# Patient Record
Sex: FEMALE | Race: WHITE | NOT HISPANIC OR LATINO
[De-identification: names, ages, dates, MRNs, and addresses within clinical notes are randomized per-mention and may not be internally consistent; named-entity substitution may affect disease eponyms.]

---

## 2017-08-16 ENCOUNTER — TRANSCRIPTION ENCOUNTER (OUTPATIENT)
Age: 82
End: 2017-08-16

## 2017-09-02 ENCOUNTER — TRANSCRIPTION ENCOUNTER (OUTPATIENT)
Age: 82
End: 2017-09-02

## 2021-12-07 PROBLEM — Z00.00 ENCOUNTER FOR PREVENTIVE HEALTH EXAMINATION: Status: ACTIVE | Noted: 2021-12-07

## 2021-12-10 ENCOUNTER — APPOINTMENT (OUTPATIENT)
Dept: PAIN MANAGEMENT | Facility: CLINIC | Age: 86
End: 2021-12-10
Payer: MEDICARE

## 2021-12-10 ENCOUNTER — NON-APPOINTMENT (OUTPATIENT)
Age: 86
End: 2021-12-10

## 2021-12-10 VITALS
HEIGHT: 60 IN | HEART RATE: 78 BPM | SYSTOLIC BLOOD PRESSURE: 147 MMHG | BODY MASS INDEX: 41.23 KG/M2 | DIASTOLIC BLOOD PRESSURE: 82 MMHG | WEIGHT: 210 LBS

## 2021-12-10 PROCEDURE — 99204 OFFICE O/P NEW MOD 45 MIN: CPT

## 2021-12-10 RX ORDER — INSULIN ASPART 100 [IU]/ML
100 INJECTION, SOLUTION INTRAVENOUS; SUBCUTANEOUS
Refills: 0 | Status: ACTIVE | COMMUNITY

## 2021-12-10 NOTE — HISTORY OF PRESENT ILLNESS
[FreeTextEntry1] : HPI\par \par Ms. MEME THORNTON is a 87 year F with pmhx of cad sp stent placement on asa 81mg, DM, HTN, right TKR presents with bilateral anterior  leg pain when standing.  Pain is so bad that patient finds it difficult to perform adls and ambulate. denies any worsening numbness, weakness, bowel/bladder dysfunction\par \par \par Previous and current pain medications/doses/effects:\par \par Tramadol with mild improvement\par \par Previous Pain Treatments:\par \par PT with mild improvement\par \par Previous Pain Injections:\par \par Previous Diagnostic Studies/Images:\par \par XR LS 11/2021\par \par Advanced diffuse degenerative spondylosis, multilevel disc space narrowing, bridging syndesmophytes, absence of lordotic curvature. Spondy L4-5 [___ mths] : [unfilled] month(s) ago [10] : a maximum pain level of 10/10 [Throbbing] : throbbing [Burning] : burning [Sitting] : sitting [Standing] : standing [FreeTextEntry7] : bilateral leg pain

## 2021-12-10 NOTE — PHYSICAL EXAM
[Facet Tenderness] : facet tenderness [Walker] : ambulates with walker [] : Motor: [NL] : normal and symmetric bilaterally [de-identified] : Constitutional: Normal, well developed, no acute distress\par Eyes: Symmetric, External structures \par Oropharynx: Lips normal, symmetric, no external lesions appreciated\par Respiratory: Non-labored breathing, no audible wheezes\par Cardiac: Pulse palpated, no tachycardia\par Vascular: No cyanosis appreciated, no edema in bilateral lower extremities\par GI: Nondistended, no jaundice appreciated\par Neurovascular: CN2-12 grossly intact, Alert and oriented\par MSK: Normal muscle bulk, 5/5 Motor strength B/L in LE\par \par

## 2021-12-10 NOTE — ASSESSMENT
[FreeTextEntry1] : >> Imaging and Other Studies\par \par I personally reviewed the relevant imaging.  Discussed and explained to patient the likely source of pathology and pain.  Questions answered. XR\par \par back and leg pain likely secondary to lumbar radiculopathy and discogenic pain refractory to conservative treatments including 6 consecutive weeks of home exercises/PT, will obtain MRI LS to evaluate for pathology\par \par may consider PT vs intervention pending eval\par \par >> Therapy and Other Modalities\par \par continue PT\par \par >> Medications\par  \par Regarding opiate medication to manage pain. I had a detailed discussion with the patient regarding the risks of long-term opioid use, including the potential for medication side effects, hyperaglesia, endocrine dysfunction,  Encouraged weaning with assistance of current prescriber. - recommend discontinuation of tramadol\par \par acetaminophen 650mg q8h prn pain (caution <3g daily)\par \par >> Interventions\par \par na\par \par >> Consults\par \par >> Discussion of Risks/Benefits/Alternatives\par \par 	>Regarding any scheduled procedures:\par \par I have discussed in detail with the patient that any interventional pain procedure is associated with potential risks.  The procedure may include an injection of steroids and potentially other medications (local anesthetic and normal saline) into the epidural space or surrounding tissue of the spine.  There are significant risks of this procedure which include and are not limited to infection, bleeding, worsening pain, dural puncture leading to postdural puncture headache, nerve damage, spinal cord injury, paralysis, stroke, and death.  \par \par There is a chance that the procedure does not improve their pain.  \par \par There are risks associated with the steroid being absorbed into the body systemically.  These include dysphoria, difficulty sleeping, mood swings and personality changes.  Premenopausal women may notice an irregularity in her menstrual cycle for 2-3 months following the injection.  Steroids can specifically affect patients with hypertension, diabetes, and peptic ulcers.  The procedure may cause a temporary increase in blood pressure and blood pressure, and may adversely affect a peptic ulcer.  Other, more rare complications, include avascular necrosis of joints, glaucoma and worsening of osteoporosis. \par \par I have discussed the risks of the procedure at length with the patient, and the potential benefits of pain relief.  I have offered alternatives to the procedure.  All questions were answered.  \par \par The patient expressed understanding and wishes to proceed with the procedure.\par \par 	>Regarding COVID19 Pandemic: \par \par Any planned interventional pain procedure are scheduled because further delay may cause harm or negative outcome to patient.  The goal in performing this procedure is to avoid deterioration of function, emergency room visits (which increases exposure) and reliance on opioids.  \par \par r/b/a discussed with patient, lack of evidence to conclusively determine whether pain management procedures have any positive or negative impact on the possibility of esther the virus and/or development of any sequelae. \par \par Patient counselled regarding timing steroid based intervention 2 weeks before or after COVID-19 vaccine administration to avoid any interaction or affect on efficacy of vaccination\par \par Patient demonstrates understanding\par \par Informed patient that risks associated with the COVID-19 infection.  Informed patient steps taken to limit the risks.  We are implementing safety precautions and following protocols consistent with the CDC and state recommendations. All patients and staff will be checked for fever or signs of illness upon entry to the facility. We will limit our steroid dose to the lowest effective therapeutic dose or in some cases steroids will not be injected at all. \par \par Patient agrees to proceed\par \par >> Conclusion\par \par The above diagnosis and treatment plan is medically reasonable and necessary based on the patient encounter \par There were no barriers to communication.\par Informed patient that I would be available for any additional questions.\par Patient was instructed to call with any worsening symptoms including severe pain, new numbness/weakness, or changes in the bowel/bladder function. \par Discussed role of nsaids in pain management and all relevant risks, if patient is continuing to require after 4 weeks the patient should f/u for alternative treatment. \par Instructed patient to maintain pain diary to monitor pain level, mobility, and function.\par \par \par

## 2021-12-21 ENCOUNTER — APPOINTMENT (OUTPATIENT)
Dept: PAIN MANAGEMENT | Facility: CLINIC | Age: 86
End: 2021-12-21
Payer: MEDICARE

## 2021-12-21 PROCEDURE — 99214 OFFICE O/P EST MOD 30 MIN: CPT | Mod: 95

## 2021-12-21 NOTE — ASSESSMENT
[FreeTextEntry1] : >> Imaging and Other Studies\par \par I personally reviewed the relevant imaging.  Discussed and explained to patient the likely source of pathology and pain.  Questions answered. XR\par \par I personally reviewed the relevant imaging.  Discussed and explained to patient the likely source of pathology and pain.  Questions answered. MRI\par \par thoracic lesion found on MRI likely ddd extrusion - radiology recommended MRI w wo IVC - will obtain MRI w wo IVC\par \par creat prior to MRI to evaluate renal function\par \par >> Therapy and Other Modalities\par \par continue PT\par \par >> Medications\par  \par Regarding opiate medication to manage pain. I had a detailed discussion with the patient regarding the risks of long-term opioid use, including the potential for medication side effects, hyperaglesia, endocrine dysfunction,  Encouraged weaning with assistance of current prescriber. - recommend discontinuation of tramadol\par \par acetaminophen 650mg q8h prn pain (caution <3g daily)\par \par >> Interventions\par \par Significant component of back and leg pain likely secondary to lumbar spinal stenosis demonstrated on MRI LS.  Will schedule L4-5 interlaminar epidural steroid injection r/b/a discussed\par \par continue asa 81 for secondary prophylaxis\par \par >> Consults\par \par >> Discussion of Risks/Benefits/Alternatives\par \par 	>Regarding any scheduled procedures:\par \par I have discussed in detail with the patient that any interventional pain procedure is associated with potential risks.  The procedure may include an injection of steroids and potentially other medications (local anesthetic and normal saline) into the epidural space or surrounding tissue of the spine.  There are significant risks of this procedure which include and are not limited to infection, bleeding, worsening pain, dural puncture leading to postdural puncture headache, nerve damage, spinal cord injury, paralysis, stroke, and death.  \par \par There is a chance that the procedure does not improve their pain.  \par \par There are risks associated with the steroid being absorbed into the body systemically.  These include dysphoria, difficulty sleeping, mood swings and personality changes.  Premenopausal women may notice an irregularity in her menstrual cycle for 2-3 months following the injection.  Steroids can specifically affect patients with hypertension, diabetes, and peptic ulcers.  The procedure may cause a temporary increase in blood pressure and blood pressure, and may adversely affect a peptic ulcer.  Other, more rare complications, include avascular necrosis of joints, glaucoma and worsening of osteoporosis. \par \par I have discussed the risks of the procedure at length with the patient, and the potential benefits of pain relief.  I have offered alternatives to the procedure.  All questions were answered.  \par \par The patient expressed understanding and wishes to proceed with the procedure.\par \par 	>Regarding COVID19 Pandemic: \par \par Any planned interventional pain procedure are scheduled because further delay may cause harm or negative outcome to patient.  The goal in performing this procedure is to avoid deterioration of function, emergency room visits (which increases exposure) and reliance on opioids.  \par \par r/b/a discussed with patient, lack of evidence to conclusively determine whether pain management procedures have any positive or negative impact on the possibility of esther the virus and/or development of any sequelae. \par \par Patient counselled regarding timing steroid based intervention 2 weeks before or after COVID-19 vaccine administration to avoid any interaction or affect on efficacy of vaccination\par \par Patient demonstrates understanding\par \par Informed patient that risks associated with the COVID-19 infection.  Informed patient steps taken to limit the risks.  We are implementing safety precautions and following protocols consistent with the CDC and state recommendations. All patients and staff will be checked for fever or signs of illness upon entry to the facility. We will limit our steroid dose to the lowest effective therapeutic dose or in some cases steroids will not be injected at all. \par \par Patient agrees to proceed\par \par >> Conclusion\par \par The above diagnosis and treatment plan is medically reasonable and necessary based on the patient encounter \par There were no barriers to communication.\par Informed patient that I would be available for any additional questions.\par Patient was instructed to call with any worsening symptoms including severe pain, new numbness/weakness, or changes in the bowel/bladder function. \par Discussed role of nsaids in pain management and all relevant risks, if patient is continuing to require after 4 weeks the patient should f/u for alternative treatment. \par Instructed patient to maintain pain diary to monitor pain level, mobility, and function.\par \par I explained to patient benefits and limitation of TeleMedicine visits\par \par Patient understands that limitations include inability to perform comprehensive physical exam, which may lead to potential diagnostic inconsistencies.  \par \par Any scheduled procedures are based on history, imaging and limited physical exam performed on TeleHealth visit.  If necessary, additional focal physical exam will be performed on date of procedure\par \par Patient understands that diagnosis and treatment may be limited by these inconsistencies and patient agrees to proceed with care plan\par \par \par \par

## 2021-12-21 NOTE — HISTORY OF PRESENT ILLNESS
[Home] : at home, [unfilled] , at the time of the visit. [Medical Office: ([facility])___] : at the medical office located in  [Verbal consent obtained from patient] : the patient, [unfilled] [___ mths] : [unfilled] month(s) ago [10] : a maximum pain level of 10/10 [Throbbing] : throbbing [Burning] : burning [Sitting] : sitting [Standing] : standing [FreeTextEntry1] : Interval Note:\par \par Since last visit the pain is not improved.  Continues to have significant pain over the bilateral anterior thigh  Pain is so bad that patient finds it difficult to perform adls and ambulate. Denies any additional weakness, numbness, bowel/bladder dysfunction.  \par \par HPI\par \par Ms. MEME THORNTON is a 87 year F with pmhx of cad sp stent placement on asa 81mg, DM, HTN, right TKR presents with bilateral anterior  leg pain when standing.  Pain is so bad that patient finds it difficult to perform adls and ambulate. denies any worsening numbness, weakness, bowel/bladder dysfunction\par \par \par Previous and current pain medications/doses/effects:\par \par Tramadol with mild improvement\par \par Previous Pain Treatments:\par \par PT with mild improvement\par \par Previous Pain Injections:\par \par Previous Diagnostic Studies/Images:\par \par MRI LS 12/21\par \par Severe degenerative spondylosis is seen  Modic changes L1-2, L2-3, L3-4, L4-5\par L5-S1 mild lateral recess narrowing and mild neuroforaminal narrowing is seen due to degenerative spondylosis\par L4-5 spondylosis and disc bulging with mild central canal stenosis, marked lateral recess narrowing and marked neuroforaminal narrowings\par L2-3 and L3-4 spondylosis and disc bulging noted which resulted in marked central canal stenosis, marked lateral recess narrowing and marked neuroforaminal narrowing\par L1-2, T12-L1, T11-12 spondylosis and disc bulging noted with mild lateral recess narrowing and moderate neuroforaminal narrowing\par T10-11, spondylosis and disc bulging noted with mild lateral recess narrowing and mild neuroforaminal narrowings\par T9-10 spondylosis and disc bulging with mild central stenosis, moderate lateral recess narrowing and moderate neuroforaminal narrowing\par T8-9 spondylosis and disc bulging resulting in mild lateral recess narrowings and mild neuroforaminal narrowings\par \par Immediately posterior to T8 verteral body, there is an epidural structure seen, likely disc extrusion resulting in mild central canal stenosis.  MRI of thoracic spine w wo gadolinium recommended for additional evaluation - possible disc extrusion, but nerve sheath tumor or metastatic deposit cannot be excluded\par \par XR LS 11/2021\par \par Advanced diffuse degenerative spondylosis, multilevel disc space narrowing, bridging syndesmophytes, absence of lordotic curvature. Spondy L4-5 [Family Member] : family member [FreeTextEntry7] : bilateral leg pain

## 2021-12-21 NOTE — PHYSICAL EXAM
[de-identified] : \par Constitutional: Normal, well developed, no acute distress on audio/video examination\par Eyes: Symmetric, External structures on video examination\par ENT: Lips, mucosa and tongue normal on video examination\par Oropharynx: Lips normal, symmetric, no external lesions appreciated appreciated on video examination\par Respiratory: Non-labored breathing, no audible wheezes appreciated on audio/video examination\par Vascular: No cyanosis appreciated or edema appreciated on video examination\par GI:  no jaundice appreciated on video examination\par Neurovascular: CN grossly intact on video/audio examination, alert\par MSK: Normal muscle bulk on video examination\par

## 2021-12-27 ENCOUNTER — RESULT REVIEW (OUTPATIENT)
Age: 86
End: 2021-12-27

## 2021-12-30 ENCOUNTER — APPOINTMENT (OUTPATIENT)
Dept: PAIN MANAGEMENT | Facility: HOSPITAL | Age: 86
End: 2021-12-30

## 2022-01-05 ENCOUNTER — NON-APPOINTMENT (OUTPATIENT)
Age: 87
End: 2022-01-05

## 2022-01-05 ENCOUNTER — APPOINTMENT (OUTPATIENT)
Dept: CARDIOLOGY | Facility: CLINIC | Age: 87
End: 2022-01-05
Payer: MEDICARE

## 2022-01-05 VITALS
BODY MASS INDEX: 40.62 KG/M2 | DIASTOLIC BLOOD PRESSURE: 64 MMHG | WEIGHT: 208 LBS | OXYGEN SATURATION: 97 % | HEART RATE: 88 BPM | SYSTOLIC BLOOD PRESSURE: 164 MMHG

## 2022-01-05 DIAGNOSIS — R60.0 LOCALIZED EDEMA: ICD-10-CM

## 2022-01-05 DIAGNOSIS — Z86.39 PERSONAL HISTORY OF OTHER ENDOCRINE, NUTRITIONAL AND METABOLIC DISEASE: ICD-10-CM

## 2022-01-05 DIAGNOSIS — Z86.59 PERSONAL HISTORY OF OTHER MENTAL AND BEHAVIORAL DISORDERS: ICD-10-CM

## 2022-01-05 DIAGNOSIS — E78.5 HYPERLIPIDEMIA, UNSPECIFIED: ICD-10-CM

## 2022-01-05 DIAGNOSIS — N32.81 OVERACTIVE BLADDER: ICD-10-CM

## 2022-01-05 DIAGNOSIS — Z87.39 PERSONAL HISTORY OF OTHER DISEASES OF THE MUSCULOSKELETAL SYSTEM AND CONNECTIVE TISSUE: ICD-10-CM

## 2022-01-05 PROCEDURE — 93000 ELECTROCARDIOGRAM COMPLETE: CPT

## 2022-01-05 PROCEDURE — 99204 OFFICE O/P NEW MOD 45 MIN: CPT

## 2022-01-05 RX ORDER — CHOLECALCIFEROL (VITAMIN D3) 1250 MCG
1.25 MG CAPSULE ORAL WEEKLY
Refills: 0 | Status: ACTIVE | COMMUNITY

## 2022-01-05 RX ORDER — DESVENLAFAXINE 100 MG/1
100 TABLET, EXTENDED RELEASE ORAL DAILY
Refills: 0 | Status: ACTIVE | COMMUNITY

## 2022-01-05 RX ORDER — FUROSEMIDE 20 MG/1
20 TABLET ORAL EVERY OTHER DAY
Refills: 0 | Status: ACTIVE | COMMUNITY

## 2022-01-05 RX ORDER — AMLODIPINE BESYLATE 5 MG/1
5 TABLET ORAL DAILY
Refills: 0 | Status: DISCONTINUED | COMMUNITY
End: 2022-01-05

## 2022-01-05 RX ORDER — FUROSEMIDE 40 MG/1
40 TABLET ORAL EVERY OTHER DAY
Refills: 0 | Status: ACTIVE | COMMUNITY

## 2022-01-05 RX ORDER — INSULIN DETEMIR 100 [IU]/ML
100 INJECTION, SOLUTION SUBCUTANEOUS AT BEDTIME
Refills: 0 | Status: ACTIVE | COMMUNITY

## 2022-01-05 RX ORDER — METOPROLOL SUCCINATE 50 MG/1
50 TABLET, EXTENDED RELEASE ORAL DAILY
Refills: 0 | Status: DISCONTINUED | COMMUNITY
End: 2022-01-05

## 2022-01-05 RX ORDER — GAUZE BANDAGE 2" X 2"
BANDAGE TOPICAL
Refills: 0 | Status: ACTIVE | COMMUNITY

## 2022-01-05 RX ORDER — CARVEDILOL 6.25 MG/1
6.25 TABLET, FILM COATED ORAL
Qty: 180 | Refills: 2 | Status: ACTIVE | COMMUNITY
Start: 2022-01-05 | End: 1900-01-01

## 2022-01-05 RX ORDER — ACETAMINOPHEN 650 MG/1
650 TABLET, FILM COATED, EXTENDED RELEASE ORAL 3 TIMES DAILY
Refills: 0 | Status: ACTIVE | COMMUNITY

## 2022-01-05 RX ORDER — ASPIRIN 81 MG
81 TABLET, DELAYED RELEASE (ENTERIC COATED) ORAL DAILY
Refills: 0 | Status: ACTIVE | COMMUNITY

## 2022-01-05 RX ORDER — IBUPROFEN 200 MG
600 CAPSULE ORAL DAILY
Refills: 0 | Status: ACTIVE | COMMUNITY

## 2022-01-05 RX ORDER — OMEGA-3/DHA/EPA/FISH OIL 300-1000MG
1000 CAPSULE ORAL
Refills: 0 | Status: ACTIVE | COMMUNITY

## 2022-01-05 RX ORDER — IRBESARTAN 300 MG/1
300 TABLET ORAL DAILY
Refills: 0 | Status: ACTIVE | COMMUNITY

## 2022-01-05 RX ORDER — AMLODIPINE BESYLATE 10 MG/1
10 TABLET ORAL
Qty: 90 | Refills: 3 | Status: ACTIVE | COMMUNITY
Start: 2022-01-05 | End: 1900-01-01

## 2022-01-05 RX ORDER — ATORVASTATIN CALCIUM 80 MG/1
80 TABLET, FILM COATED ORAL AT BEDTIME
Refills: 0 | Status: ACTIVE | COMMUNITY

## 2022-01-05 RX ORDER — OXYBUTYNIN CHLORIDE 5 MG/1
5 TABLET ORAL DAILY
Refills: 0 | Status: ACTIVE | COMMUNITY

## 2022-01-05 NOTE — PHYSICAL EXAM
[Well Developed] : well developed [Well Nourished] : well nourished [No Acute Distress] : no acute distress [Normal Conjunctiva] : normal conjunctiva [Normal Venous Pressure] : normal venous pressure [No Carotid Bruit] : no carotid bruit [Normal S1, S2] : normal S1, S2 [No Murmur] : no murmur [No Rub] : no rub [No Gallop] : no gallop [Clear Lung Fields] : clear lung fields [Good Air Entry] : good air entry [No Respiratory Distress] : no respiratory distress  [No Cyanosis] : no cyanosis [No Clubbing] : no clubbing [No Focal Deficits] : no focal deficits [Normal Speech] : normal speech [Alert and Oriented] : alert and oriented [Normal memory] : normal memory

## 2022-01-06 ENCOUNTER — NON-APPOINTMENT (OUTPATIENT)
Age: 87
End: 2022-01-06

## 2022-01-06 NOTE — ASSESSMENT
[FreeTextEntry1] : 86 yo female with CAD -> PCI at [facility] & PCI at [facility]3] (details are currently not available), hyperlipidemia, and hypertension. \par ECG today demonstrated sinus rhythm with LVH and poor R wave progression.\par \par BP is currently not controlled on current regimen of amlodipine 5 mg po daily, irbesartan 300 mg po daily, and metoprolol succinate 50 mg po daily. \par Will increase amlodipine to 10 mg po daily and replace metoprolol succinate 50 mg po daily with carvedilol 6.25 mg po bid. \par Patient to return in 1 week for BP check.\par \par Patient appears to be clinically stable from CAD standpoint. \par Will continue to monitor clinically on current regimen of aspirin, beta-blocker, amlodipine, irbesartan, and atorvastatin.\par Will try to obtain records from former cardiologist, Dr. Jarrod Mooney (last seen in 2017). After review of prior records, will determine if further cardiac evaluation/intervention is indicated.

## 2022-01-06 NOTE — HISTORY OF PRESENT ILLNESS
[FreeTextEntry1] : 88 yo female ([facility] resident) with CAD -> PCI at [facility] & PCI at [facility] [place] (details are currently not available), hyperlipidemia, and hypertension. Patient presents today for hypertension management after her scheduled epidural on 12/30/21 at [place] was cancelled due to uncontrolled HTN with /90s. Patient denies chest pain, dyspnea, palpitations, syncope, edema, melena, hematochezia, or hematemesis. Patient is unable to provide any significant details regarding her prior cardiac history. She reportedly last saw Dr. Jarrod Mooney (cardiology) in 2017. She reports that her BP is usually 160/70s at home. \par \par PMD: Dr. Gonzales (at [facility])

## 2022-01-10 ENCOUNTER — RESULT REVIEW (OUTPATIENT)
Age: 87
End: 2022-01-10

## 2022-01-11 ENCOUNTER — APPOINTMENT (OUTPATIENT)
Dept: CARDIOLOGY | Facility: CLINIC | Age: 87
End: 2022-01-11
Payer: MEDICARE

## 2022-01-11 VITALS
BODY MASS INDEX: 41.23 KG/M2 | HEART RATE: 77 BPM | DIASTOLIC BLOOD PRESSURE: 55 MMHG | OXYGEN SATURATION: 98 % | WEIGHT: 210 LBS | HEIGHT: 60 IN | SYSTOLIC BLOOD PRESSURE: 124 MMHG

## 2022-01-11 DIAGNOSIS — I25.10 ATHEROSCLEROTIC HEART DISEASE OF NATIVE CORONARY ARTERY W/OUT ANGINA PECTORIS: ICD-10-CM

## 2022-01-11 DIAGNOSIS — I10 ESSENTIAL (PRIMARY) HYPERTENSION: ICD-10-CM

## 2022-01-11 DIAGNOSIS — Z01.810 ENCOUNTER FOR PREPROCEDURAL CARDIOVASCULAR EXAMINATION: ICD-10-CM

## 2022-01-11 PROCEDURE — 99214 OFFICE O/P EST MOD 30 MIN: CPT

## 2022-01-11 NOTE — PHYSICAL EXAM
[Well Developed] : well developed [Well Nourished] : well nourished [No Acute Distress] : no acute distress [Normal Conjunctiva] : normal conjunctiva [Normal Venous Pressure] : normal venous pressure [No Carotid Bruit] : no carotid bruit [Normal S1, S2] : normal S1, S2 [No Murmur] : no murmur [No Rub] : no rub [No Gallop] : no gallop [Clear Lung Fields] : clear lung fields [Good Air Entry] : good air entry [No Respiratory Distress] : no respiratory distress  [No Cyanosis] : no cyanosis [No Clubbing] : no clubbing [No Focal Deficits] : no focal deficits [Normal Speech] : normal speech [Alert and Oriented] : alert and oriented [Normal memory] : normal memory [No Edema] : no edema

## 2022-01-11 NOTE — HISTORY OF PRESENT ILLNESS
[FreeTextEntry1] : 86 yo female ([facility] resident) with CAD -> PCI at [facility] & PCI at [facility] (details are currently not available), hyperlipidemia, and hypertension. Patient presents today for hypertension follow-up after increasing amlodipine to 10 mg po daily and changing metoprolol succinate 50 mg po daily to carvedilol 6.25 mg po bid. She denies any complaints. Patient denies chest pain, dyspnea, palpitations, syncope, worsening edema, melena, hematochezia, or hematemesis. \par \par PMD: Dr. Gonzales (at [facility])

## 2022-01-11 NOTE — CARDIOLOGY SUMMARY
[de-identified] : \par 1/5/22 ECG: Sinus rhythm, rate 86 bpm, LVH, poor R wave progression\par  [de-identified] : \par 3/1/17 Echo:\par Normal LV size and LVEF 60%. \par Normal RV size and systolic function. \par Mildly dilated LA.\par Mild AV thickening.\par Mild MV thickening and MAC. Trace MR.\par PASP at least 41 mmHg.

## 2022-01-11 NOTE — ASSESSMENT
[FreeTextEntry1] : 86 yo female with CAD -> PCI at [facility] & PCI at [facility] (> 10  years ago?, details are currently not available), hyperlipidemia, and hypertension. \par ECG ton 1/5/21 demonstrated sinus rhythm with LVH and poor R wave progression.\par \par BP is currently controlled on amlodipine 10 mg po daily, irbesartan 300 mg po daily, and carvedilol 6.25 mg po bid. \par \par Patient remains clinically stable from CAD standpoint. \par Will continue to monitor clinically on current regimen of aspirin, carvedilol, amlodipine, irbesartan, and atorvastatin.\par Records from Dr. Jarrod Mooney were reviewed and did not provide any details regarding her prior PCIs. I also called [facility] and they did not have any records regarding PCI from the past 10 years. \par Should patient develop chest pain or dyspnea, will perform echo and nuclear stress test at that time. \par \par Patient does not have any cardiac contraindications to pending epidural now that her BP is better controlled.\par Patient may proceed with acceptable cardiac risk and without further cardiac work-up or intervention.

## 2022-01-13 ENCOUNTER — RESULT REVIEW (OUTPATIENT)
Age: 87
End: 2022-01-13

## 2022-01-13 ENCOUNTER — APPOINTMENT (OUTPATIENT)
Dept: PAIN MANAGEMENT | Facility: HOSPITAL | Age: 87
End: 2022-01-13

## 2022-02-02 ENCOUNTER — APPOINTMENT (OUTPATIENT)
Dept: PAIN MANAGEMENT | Facility: CLINIC | Age: 87
End: 2022-02-02
Payer: MEDICARE

## 2022-02-02 VITALS
HEIGHT: 60 IN | WEIGHT: 210 LBS | DIASTOLIC BLOOD PRESSURE: 76 MMHG | BODY MASS INDEX: 41.23 KG/M2 | TEMPERATURE: 98.2 F | SYSTOLIC BLOOD PRESSURE: 143 MMHG

## 2022-02-02 DIAGNOSIS — M89.9 DISORDER OF BONE, UNSPECIFIED: ICD-10-CM

## 2022-02-02 PROCEDURE — 99214 OFFICE O/P EST MOD 30 MIN: CPT

## 2022-02-02 NOTE — ASSESSMENT
[FreeTextEntry1] : >> Imaging and Other Studies\par \par I personally reviewed the relevant imaging.  Discussed and explained to patient the likely source of pathology and pain.  Questions answered. XR\par \par I personally reviewed the relevant imaging.  Discussed and explained to patient the likely source of pathology and pain.  Questions answered. MRI\par \par thoracic lesion found on MRI likely ddd extrusion - radiology recommended MRI w wo IVC - will obtain MRI w wo IVC\par \par creat prior to MRI to evaluate renal function\par \par >> Therapy and Other Modalities\par \par restart PT\par \par >> Medications\par  \par trial gabapentin uptitrate to TID\par cautioned change in mood.  Encouraged to call with any worsening mood or depression/suicidal ideations\par \par Regarding opiate medication to manage pain. I had a detailed discussion with the patient regarding the risks of long-term opioid use, including the potential for medication side effects, hyperaglesia, endocrine dysfunction,  Encouraged weaning with assistance of current prescriber. - recommend discontinuation of tramadol\par \par acetaminophen 650mg q8h prn pain (caution <3g daily)\par \par >> Interventions\par \par Significant component of back and leg pain likely secondary to lumbar spinal stenosis demonstrated on MRI LS.  sp L4-5 interlaminar epidural steroid injection\par \par given efficacy of previous intervention that is >30% improvement in pain and improved ability to perform adls, and return of pain despite conservative treatment, will schedule repeat L4-5 interlaminar epidural steroid injection r/b/a \par \par continue asa 81 for secondary prophylaxis\par \par candidate for scs device if patient does not wish to pursue surgical treatment\par \par >> Consults\par \par >> Discussion of Risks/Benefits/Alternatives\par \par 	>Regarding any scheduled procedures:\par \par I have discussed in detail with the patient that any interventional pain procedure is associated with potential risks.  The procedure may include an injection of steroids and potentially other medications (local anesthetic and normal saline) into the epidural space or surrounding tissue of the spine.  There are significant risks of this procedure which include and are not limited to infection, bleeding, worsening pain, dural puncture leading to postdural puncture headache, nerve damage, spinal cord injury, paralysis, stroke, and death.  \par \par There is a chance that the procedure does not improve their pain.  \par \par There are risks associated with the steroid being absorbed into the body systemically.  These include dysphoria, difficulty sleeping, mood swings and personality changes.  Premenopausal women may notice an irregularity in her menstrual cycle for 2-3 months following the injection.  Steroids can specifically affect patients with hypertension, diabetes, and peptic ulcers.  The procedure may cause a temporary increase in blood pressure and blood pressure, and may adversely affect a peptic ulcer.  Other, more rare complications, include avascular necrosis of joints, glaucoma and worsening of osteoporosis. \par \par I have discussed the risks of the procedure at length with the patient, and the potential benefits of pain relief.  I have offered alternatives to the procedure.  All questions were answered.  \par \par The patient expressed understanding and wishes to proceed with the procedure.\par \par 	>Regarding COVID19 Pandemic: \par \par Any planned interventional pain procedure are scheduled because further delay may cause harm or negative outcome to patient.  The goal in performing this procedure is to avoid deterioration of function, emergency room visits (which increases exposure) and reliance on opioids.  \par \par r/b/a discussed with patient, lack of evidence to conclusively determine whether pain management procedures have any positive or negative impact on the possibility of esther the virus and/or development of any sequelae. \par \par Patient counselled regarding timing steroid based intervention 2 weeks before or after COVID-19 vaccine administration to avoid any interaction or affect on efficacy of vaccination\par \par Patient demonstrates understanding\par \par Informed patient that risks associated with the COVID-19 infection.  Informed patient steps taken to limit the risks.  We are implementing safety precautions and following protocols consistent with the CDC and state recommendations. All patients and staff will be checked for fever or signs of illness upon entry to the facility. We will limit our steroid dose to the lowest effective therapeutic dose or in some cases steroids will not be injected at all. \par \par Patient agrees to proceed\par \par >> Conclusion\par \par The above diagnosis and treatment plan is medically reasonable and necessary based on the patient encounter \par There were no barriers to communication.\par Informed patient that I would be available for any additional questions.\par Patient was instructed to call with any worsening symptoms including severe pain, new numbness/weakness, or changes in the bowel/bladder function. \par Discussed role of nsaids in pain management and all relevant risks, if patient is continuing to require after 4 weeks the patient should f/u for alternative treatment. \par Instructed patient to maintain pain diary to monitor pain level, mobility, and function.\par \par \par

## 2022-02-02 NOTE — HISTORY OF PRESENT ILLNESS
[5] : 2. What number best describes how, during the past week, pain has interfered with your enjoyment of life? 5/10 pain [7] : 3. What number best describes how, during the past week, pain has interfered with your general activity? 7/10 pain [___ mths] : [unfilled] month(s) ago [10] : a maximum pain level of 10/10 [Throbbing] : throbbing [Burning] : burning [Sitting] : sitting [Standing] : standing [FreeTextEntry1] : Interval Note:\par \par sp  L4-5 interlaminar epidural steroid injection 1/13/22 with mild improvement in leg pain.  Patient continues to complain of bilateral thigh and leg pain especially with ambulation.\par Since last visit the pain is not improved.  Continues to have significant pain over the bilateral anterior thigh  Pain is so bad that patient finds it difficult to perform adls and ambulate. Denies any additional weakness, numbness, bowel/bladder dysfunction.  \par \par HPI\par \par Ms. MEME THORNTON is a 87 year F with pmhx of cad sp stent placement on asa 81mg, DM, HTN, right TKR presents with bilateral anterior  leg pain when standing.  Pain is so bad that patient finds it difficult to perform adls and ambulate. denies any worsening numbness, weakness, bowel/bladder dysfunction\par \par \par Previous and current pain medications/doses/effects:\par \par Tramadol with mild improvement\par \par Previous Pain Treatments:\par \par PT with mild improvement\par \par Previous Pain Injections:\par \par   L4-5 interlaminar epidural steroid injection 1/13/22\par \par Previous Diagnostic Studies/Images:\par \par MRI LS 12/21\par \par Severe degenerative spondylosis is seen  Modic changes L1-2, L2-3, L3-4, L4-5\par L5-S1 mild lateral recess narrowing and mild neuroforaminal narrowing is seen due to degenerative spondylosis\par L4-5 spondylosis and disc bulging with mild central canal stenosis, marked lateral recess narrowing and marked neuroforaminal narrowings\par L2-3 and L3-4 spondylosis and disc bulging noted which resulted in marked central canal stenosis, marked lateral recess narrowing and marked neuroforaminal narrowing\par L1-2, T12-L1, T11-12 spondylosis and disc bulging noted with mild lateral recess narrowing and moderate neuroforaminal narrowing\par T10-11, spondylosis and disc bulging noted with mild lateral recess narrowing and mild neuroforaminal narrowings\par T9-10 spondylosis and disc bulging with mild central stenosis, moderate lateral recess narrowing and moderate neuroforaminal narrowing\par T8-9 spondylosis and disc bulging resulting in mild lateral recess narrowings and mild neuroforaminal narrowings\par \par Immediately posterior to T8 verteral body, there is an epidural structure seen, likely disc extrusion resulting in mild central canal stenosis.  MRI of thoracic spine w wo gadolinium recommended for additional evaluation - possible disc extrusion, but nerve sheath tumor or metastatic deposit cannot be excluded\par \par XR LS 11/2021\par \par Advanced diffuse degenerative spondylosis, multilevel disc space narrowing, bridging syndesmophytes, absence of lordotic curvature. Spondy L4-5 [FreeTextEntry2] : 17 [FreeTextEntry7] : bilateral leg pain

## 2022-02-02 NOTE — PHYSICAL EXAM
[Normal muscle bulk without asymmetry] : normal muscle bulk without asymmetry [Walker] : ambulates with walker [Normal] : Normal affect [Facet Tenderness] : no facet tenderness

## 2022-02-14 ENCOUNTER — RESULT REVIEW (OUTPATIENT)
Age: 87
End: 2022-02-14

## 2022-02-17 ENCOUNTER — APPOINTMENT (OUTPATIENT)
Dept: PAIN MANAGEMENT | Facility: HOSPITAL | Age: 87
End: 2022-02-17

## 2022-02-17 ENCOUNTER — RESULT REVIEW (OUTPATIENT)
Age: 87
End: 2022-02-17

## 2022-03-09 ENCOUNTER — APPOINTMENT (OUTPATIENT)
Dept: PAIN MANAGEMENT | Facility: CLINIC | Age: 87
End: 2022-03-09
Payer: MEDICARE

## 2022-03-09 VITALS
DIASTOLIC BLOOD PRESSURE: 79 MMHG | TEMPERATURE: 98 F | HEIGHT: 60 IN | BODY MASS INDEX: 41.23 KG/M2 | WEIGHT: 210 LBS | SYSTOLIC BLOOD PRESSURE: 149 MMHG

## 2022-03-09 PROCEDURE — 99214 OFFICE O/P EST MOD 30 MIN: CPT

## 2022-03-09 RX ORDER — GABAPENTIN 100 MG/1
100 CAPSULE ORAL
Qty: 180 | Refills: 1 | Status: ACTIVE | COMMUNITY
Start: 2022-02-02 | End: 1900-01-01

## 2022-03-09 NOTE — ASSESSMENT
[FreeTextEntry1] : >> Imaging and Other Studies\par \par I personally reviewed the relevant imaging.  Discussed and explained to patient the likely source of pathology and pain.  Questions answered. XR\par \par I personally reviewed the relevant imaging.  Discussed and explained to patient the likely source of pathology and pain.  Questions answered. MRI\par \par >> Therapy and Other Modalities\par \par restart PT\par \par >> Medications\par  \par gabapentin uptitrate to 2 tabs TID\par cautioned change in mood.  Encouraged to call with any worsening mood or depression/suicidal ideations\par \par Regarding opiate medication to manage pain. I had a detailed discussion with the patient regarding the risks of long-term opioid use, including the potential for medication side effects, hyperaglesia, endocrine dysfunction,  Encouraged weaning with assistance of current prescriber. - recommend discontinuation of tramadol\par \par acetaminophen 650mg q8h prn pain (caution <3g daily)\par \par >> Interventions\par \par Significant component of back and leg pain likely secondary to lumbar spinal stenosis demonstrated on MRI LS.  sp L4-5 interlaminar epidural steroid injection x 2\par \par Lumbar radiculopathy secondary to disc herniation and facet arthropathy demonstrated on MRI LS, refractory to conservative treatments, will schedule LEFT L3-4 and L4-5 transforaminal epidural steroid injection r/b/a discussed\par \par continue asa 81 for secondary prophylaxis\par \par candidate for scs device if patient does not wish to pursue surgical treatment\par \par rtc with rep for more information\par \par >> Consults\par \par fu with Dr. Torres regarding incidental bone lesion\par >> Discussion of Risks/Benefits/Alternatives\par \par 	>Regarding any scheduled procedures:\par \par I have discussed in detail with the patient that any interventional pain procedure is associated with potential risks.  The procedure may include an injection of steroids and potentially other medications (local anesthetic and normal saline) into the epidural space or surrounding tissue of the spine.  There are significant risks of this procedure which include and are not limited to infection, bleeding, worsening pain, dural puncture leading to postdural puncture headache, nerve damage, spinal cord injury, paralysis, stroke, and death.  \par \par There is a chance that the procedure does not improve their pain.  \par \par There are risks associated with the steroid being absorbed into the body systemically.  These include dysphoria, difficulty sleeping, mood swings and personality changes.  Premenopausal women may notice an irregularity in her menstrual cycle for 2-3 months following the injection.  Steroids can specifically affect patients with hypertension, diabetes, and peptic ulcers.  The procedure may cause a temporary increase in blood pressure and blood pressure, and may adversely affect a peptic ulcer.  Other, more rare complications, include avascular necrosis of joints, glaucoma and worsening of osteoporosis. \par \par I have discussed the risks of the procedure at length with the patient, and the potential benefits of pain relief.  I have offered alternatives to the procedure.  All questions were answered.  \par \par The patient expressed understanding and wishes to proceed with the procedure.\par \par 	>Regarding COVID19 Pandemic: \par \par Any planned interventional pain procedure are scheduled because further delay may cause harm or negative outcome to patient.  The goal in performing this procedure is to avoid deterioration of function, emergency room visits (which increases exposure) and reliance on opioids.  \par \par r/b/a discussed with patient, lack of evidence to conclusively determine whether pain management procedures have any positive or negative impact on the possibility of esther the virus and/or development of any sequelae. \par \par Patient counselled regarding timing steroid based intervention 2 weeks before or after COVID-19 vaccine administration to avoid any interaction or affect on efficacy of vaccination\par \par Patient demonstrates understanding\par \par Informed patient that risks associated with the COVID-19 infection.  Informed patient steps taken to limit the risks.  We are implementing safety precautions and following protocols consistent with the CDC and state recommendations. All patients and staff will be checked for fever or signs of illness upon entry to the facility. We will limit our steroid dose to the lowest effective therapeutic dose or in some cases steroids will not be injected at all. \par \par Patient agrees to proceed\par \par >> Conclusion\par \par The above diagnosis and treatment plan is medically reasonable and necessary based on the patient encounter \par There were no barriers to communication.\par Informed patient that I would be available for any additional questions.\par Patient was instructed to call with any worsening symptoms including severe pain, new numbness/weakness, or changes in the bowel/bladder function. \par Discussed role of nsaids in pain management and all relevant risks, if patient is continuing to require after 4 weeks the patient should f/u for alternative treatment. \par Instructed patient to maintain pain diary to monitor pain level, mobility, and function.\par \par \par

## 2022-03-09 NOTE — PHYSICAL EXAM
[Normal] : Well developed, in no acute distress, alert and oriented to person, place and time [Normal muscle bulk without asymmetry] : normal muscle bulk without asymmetry [Facet Tenderness] : facet tenderness [Antalgic] : antalgic [Walker] : ambulates with walker [] : Motor: [NL] : normal and symmetric bilaterally

## 2022-03-09 NOTE — HISTORY OF PRESENT ILLNESS
[5] : 3. What number best describes how, during the past week, pain has interfered with your general activity? 5/10 pain [___ mths] : [unfilled] month(s) ago [10] : a maximum pain level of 10/10 [Throbbing] : throbbing [Burning] : burning [Sitting] : sitting [Standing] : standing [FreeTextEntry1] : Interval Note:\par \par sp  L4-5 interlaminar epidural steroid injection with significant improvement in right back and leg pain.  Patient reports that she is much more comfortable  Patient continues to complain of bilateral thigh and leg pain especially with ambulation.\par Since last visit the pain is not improved.  Continues to have significant pain over the bilateral anterior thigh  Pain is so bad that patient finds it difficult to perform adls and ambulate. Denies any additional weakness, numbness, bowel/bladder dysfunction.  \par \par HPI\par \par Ms. MEME THORNTON is a 87 year F with pmhx of cad sp stent placement on asa 81mg, DM, HTN, right TKR presents with bilateral anterior  leg pain when standing.  Pain is so bad that patient finds it difficult to perform adls and ambulate. denies any worsening numbness, weakness, bowel/bladder dysfunction\par \par \par Previous and current pain medications/doses/effects:\par \par Tramadol with mild improvement\par \par Previous Pain Treatments:\par \par PT with mild improvement\par \par Previous Pain Injections:\par \par  L4-5 interlaminar epidural steroid injection 2/17/22\par   L4-5 interlaminar epidural steroid injection 1/13/22\par \par Previous Diagnostic Studies/Images:\par \par MRI TS w wo ivc 2/212\par \par \par The patient could not tolerate intravenous contrast because of\par abnormal renal function.\par Thoracic spine alignment is within normal limits other than mild right convex scoliosis\par centered at approximately T8. There is no compression fracture, and no marrow signal\par abnormality is present. A hypointense structure posterior to the T8 vertebral body\par measures up to 5 mm and is best seen on T2-weighted imaging. The axial images\par seen do not include this area of concern.\par The etiology of this lesion is unclear, and the possibility of extruded disc material is\par raised but considered less likely given the relatively round morphology of the lesion.\par Epidural calcification is possible. Moderate thoracic degenerative disc disease is noted\par with multiple posterior disc bulges but no canal or foraminal stenosis.\par IMPRESSION:\par A 5 mm dural-based lesion at the level of T8 of uncertain significance. A small\par meningioma is possible. Characterization with CT of the thoracic spine without contrast\par is suggested.\par \par MRI LS 12/21\par \par Severe degenerative spondylosis is seen  Modic changes L1-2, L2-3, L3-4, L4-5\par L5-S1 mild lateral recess narrowing and mild neuroforaminal narrowing is seen due to degenerative spondylosis\par L4-5 spondylosis and disc bulging with mild central canal stenosis, marked lateral recess narrowing and marked neuroforaminal narrowings\par L2-3 and L3-4 spondylosis and disc bulging noted which resulted in marked central canal stenosis, marked lateral recess narrowing and marked neuroforaminal narrowing\par L1-2, T12-L1, T11-12 spondylosis and disc bulging noted with mild lateral recess narrowing and moderate neuroforaminal narrowing\par T10-11, spondylosis and disc bulging noted with mild lateral recess narrowing and mild neuroforaminal narrowings\par T9-10 spondylosis and disc bulging with mild central stenosis, moderate lateral recess narrowing and moderate neuroforaminal narrowing\par T8-9 spondylosis and disc bulging resulting in mild lateral recess narrowings and mild neuroforaminal narrowings\par \par Immediately posterior to T8 verteral body, there is an epidural structure seen, likely disc extrusion resulting in mild central canal stenosis.  MRI of thoracic spine w wo gadolinium recommended for additional evaluation - possible disc extrusion, but nerve sheath tumor or metastatic deposit cannot be excluded\par \par XR LS 11/2021\par \par Advanced diffuse degenerative spondylosis, multilevel disc space narrowing, bridging syndesmophytes, absence of lordotic curvature. Spondy L4-5 [FreeTextEntry2] : 15 [FreeTextEntry7] : bilateral leg pain

## 2022-03-14 ENCOUNTER — RESULT REVIEW (OUTPATIENT)
Age: 87
End: 2022-03-14

## 2022-03-17 ENCOUNTER — APPOINTMENT (OUTPATIENT)
Dept: PAIN MANAGEMENT | Facility: HOSPITAL | Age: 87
End: 2022-03-17

## 2022-03-17 ENCOUNTER — RESULT REVIEW (OUTPATIENT)
Age: 87
End: 2022-03-17

## 2022-03-30 ENCOUNTER — APPOINTMENT (OUTPATIENT)
Dept: PAIN MANAGEMENT | Facility: CLINIC | Age: 87
End: 2022-03-30
Payer: MEDICARE

## 2022-03-30 VITALS
DIASTOLIC BLOOD PRESSURE: 67 MMHG | HEIGHT: 60 IN | WEIGHT: 210 LBS | BODY MASS INDEX: 41.23 KG/M2 | TEMPERATURE: 98 F | SYSTOLIC BLOOD PRESSURE: 111 MMHG

## 2022-03-30 DIAGNOSIS — M79.2 NEURALGIA AND NEURITIS, UNSPECIFIED: ICD-10-CM

## 2022-03-30 DIAGNOSIS — G89.4 CHRONIC PAIN SYNDROME: ICD-10-CM

## 2022-03-30 DIAGNOSIS — M47.817 SPONDYLOSIS W/OUT MYELOPATHY OR RADICULOPATHY, LUMBOSACRAL REGION: ICD-10-CM

## 2022-03-30 DIAGNOSIS — M48.061 SPINAL STENOSIS, LUMBAR REGION WITHOUT NEUROGENIC CLAUDICATION: ICD-10-CM

## 2022-03-30 DIAGNOSIS — M79.18 MYALGIA, OTHER SITE: ICD-10-CM

## 2022-03-30 PROCEDURE — 99214 OFFICE O/P EST MOD 30 MIN: CPT

## 2022-03-30 NOTE — PHYSICAL EXAM
[Normal] : Well developed, in no acute distress, alert and oriented to person, place and time [Normal muscle bulk without asymmetry] : normal muscle bulk without asymmetry [Facet Tenderness] : facet tenderness [Antalgic] : antalgic

## 2022-03-30 NOTE — HISTORY OF PRESENT ILLNESS
[7] : 3. What number best describes how, during the past week, pain has interfered with your general activity? 7/10 pain [___ mths] : [unfilled] month(s) ago [10] : a maximum pain level of 10/10 [Throbbing] : throbbing [Burning] : burning [Sitting] : sitting [Standing] : standing [FreeTextEntry1] : Interval Note:\par  LEFT L3-4 and L4-5 transforaminal epidural steroid injection with transient improvement of left back and leg pain. \par Patient continues to complain of bilateral thigh and leg pain especially with ambulation.\par Since last visit the pain is not improved.  Continues to have significant pain over the bilateral anterior thigh  Pain is so bad that patient finds it difficult to perform adls and ambulate. Denies any additional weakness, numbness, bowel/bladder dysfunction.  \par \par HPI\par \par Ms. MEME THORNTON is a 87 year F with pmhx of cad sp stent placement on asa 81mg, DM, HTN, right TKR presents with bilateral anterior  leg pain when standing.  Pain is so bad that patient finds it difficult to perform adls and ambulate. denies any worsening numbness, weakness, bowel/bladder dysfunction\par \par \par Previous and current pain medications/doses/effects:\par \par Tramadol with mild improvement\par \par Previous Pain Treatments:\par \par PT with mild improvement\par \par Previous Pain Injections:\par  LEFT L3-4 and L4-5 transforaminal epidural steroid injection 3/17/22\par  L4-5 interlaminar epidural steroid injection 2/17/22\par   L4-5 interlaminar epidural steroid injection 1/13/22\par \par Previous Diagnostic Studies/Images:\par \par MRI TS w wo ivc 2/212\par \par \par The patient could not tolerate intravenous contrast because of\par abnormal renal function.\par Thoracic spine alignment is within normal limits other than mild right convex scoliosis\par centered at approximately T8. There is no compression fracture, and no marrow signal\par abnormality is present. A hypointense structure posterior to the T8 vertebral body\par measures up to 5 mm and is best seen on T2-weighted imaging. The axial images\par seen do not include this area of concern.\par The etiology of this lesion is unclear, and the possibility of extruded disc material is\par raised but considered less likely given the relatively round morphology of the lesion.\par Epidural calcification is possible. Moderate thoracic degenerative disc disease is noted\par with multiple posterior disc bulges but no canal or foraminal stenosis.\par IMPRESSION:\par A 5 mm dural-based lesion at the level of T8 of uncertain significance. A small\par meningioma is possible. Characterization with CT of the thoracic spine without contrast\par is suggested.\par \par MRI LS 12/21\par \par Severe degenerative spondylosis is seen  Modic changes L1-2, L2-3, L3-4, L4-5\par L5-S1 mild lateral recess narrowing and mild neuroforaminal narrowing is seen due to degenerative spondylosis\par L4-5 spondylosis and disc bulging with mild central canal stenosis, marked lateral recess narrowing and marked neuroforaminal narrowings\par L2-3 and L3-4 spondylosis and disc bulging noted which resulted in marked central canal stenosis, marked lateral recess narrowing and marked neuroforaminal narrowing\par L1-2, T12-L1, T11-12 spondylosis and disc bulging noted with mild lateral recess narrowing and moderate neuroforaminal narrowing\par T10-11, spondylosis and disc bulging noted with mild lateral recess narrowing and mild neuroforaminal narrowings\par T9-10 spondylosis and disc bulging with mild central stenosis, moderate lateral recess narrowing and moderate neuroforaminal narrowing\par T8-9 spondylosis and disc bulging resulting in mild lateral recess narrowings and mild neuroforaminal narrowings\par \par Immediately posterior to T8 verteral body, there is an epidural structure seen, likely disc extrusion resulting in mild central canal stenosis.  MRI of thoracic spine w wo gadolinium recommended for additional evaluation - possible disc extrusion, but nerve sheath tumor or metastatic deposit cannot be excluded\par \par XR LS 11/2021\par \par Advanced diffuse degenerative spondylosis, multilevel disc space narrowing, bridging syndesmophytes, absence of lordotic curvature. Spondy L4-5 [FreeTextEntry2] : 21 [FreeTextEntry7] : bilateral leg pain

## 2022-03-30 NOTE — ASSESSMENT
[FreeTextEntry1] : >> Imaging and Other Studies\par \par I personally reviewed the relevant imaging.  Discussed and explained to patient the likely source of pathology and pain.  Questions answered. XR\par \par I personally reviewed the relevant imaging.  Discussed and explained to patient the likely source of pathology and pain.  Questions answered. MRI\par \par >> Therapy and Other Modalities\par \par restart PT\par \par >> Medications\par  \par gabapentin uptitrate to 2 tabs TID\par cautioned change in mood.  Encouraged to call with any worsening mood or depression/suicidal ideations\par \par Regarding opiate medication to manage pain. I had a detailed discussion with the patient regarding the risks of long-term opioid use, including the potential for medication side effects, hyperaglesia, endocrine dysfunction,  Encouraged weaning with assistance of current prescriber. - recommend discontinuation of tramadol\par \par acetaminophen 650mg q8h prn pain (caution <3g daily)\par \par >> Interventions\par \par Significant component of back and leg pain likely secondary to lumbar spinal stenosis demonstrated on MRI LS.  sp L4-5 interlaminar epidural steroid injection x 2\par \par Lumbar radiculopathy secondary to disc herniation and facet arthropathy demonstrated on MRI LS, refractory to conservative treatments, sp LEFT L3-4 and L4-5 transforaminal epidural steroid injection r/b/a discussed\par \par continue asa 81 for secondary prophylaxis\par \par candidate for scs device if patient does not wish to pursue surgical treatment\par \par neuropathic pain persists despite conservative treatments and interventions\par Will schedule spinal cord stimulator trial r/b/a discussed\par \par Referral to Psych for evaluation\par \par rtc pre trial for information session with myself and rep\par \par \par \par \par >> Consults\par \par fu with Dr. Torres regarding incidental anterior dural lesion at T8 - unlikely to affect placement of scs trial given that it is anterior\par \par >> Discussion of Risks/Benefits/Alternatives\par \par 	>Regarding any scheduled procedures:\par \par I have discussed in detail with the patient that any interventional pain procedure is associated with potential risks.  The procedure may include an injection of steroids and potentially other medications (local anesthetic and normal saline) into the epidural space or surrounding tissue of the spine.  There are significant risks of this procedure which include and are not limited to infection, bleeding, worsening pain, dural puncture leading to postdural puncture headache, nerve damage, spinal cord injury, paralysis, stroke, and death.  \par \par There is a chance that the procedure does not improve their pain.  \par \par There are risks associated with the steroid being absorbed into the body systemically.  These include dysphoria, difficulty sleeping, mood swings and personality changes.  Premenopausal women may notice an irregularity in her menstrual cycle for 2-3 months following the injection.  Steroids can specifically affect patients with hypertension, diabetes, and peptic ulcers.  The procedure may cause a temporary increase in blood pressure and blood pressure, and may adversely affect a peptic ulcer.  Other, more rare complications, include avascular necrosis of joints, glaucoma and worsening of osteoporosis. \par \par I have discussed the risks of the procedure at length with the patient, and the potential benefits of pain relief.  I have offered alternatives to the procedure.  All questions were answered.  \par \par The patient expressed understanding and wishes to proceed with the procedure.\par \par 	>Regarding COVID19 Pandemic: \par \par Any planned interventional pain procedure are scheduled because further delay may cause harm or negative outcome to patient.  The goal in performing this procedure is to avoid deterioration of function, emergency room visits (which increases exposure) and reliance on opioids.  \par \par r/b/a discussed with patient, lack of evidence to conclusively determine whether pain management procedures have any positive or negative impact on the possibility of esther the virus and/or development of any sequelae. \par \par Patient counselled regarding timing steroid based intervention 2 weeks before or after COVID-19 vaccine administration to avoid any interaction or affect on efficacy of vaccination\par \par Patient demonstrates understanding\par \par Informed patient that risks associated with the COVID-19 infection.  Informed patient steps taken to limit the risks.  We are implementing safety precautions and following protocols consistent with the CDC and state recommendations. All patients and staff will be checked for fever or signs of illness upon entry to the facility. We will limit our steroid dose to the lowest effective therapeutic dose or in some cases steroids will not be injected at all. \par \par Patient agrees to proceed\par \par >> Conclusion\par \par The above diagnosis and treatment plan is medically reasonable and necessary based on the patient encounter \par There were no barriers to communication.\par Informed patient that I would be available for any additional questions.\par Patient was instructed to call with any worsening symptoms including severe pain, new numbness/weakness, or changes in the bowel/bladder function. \par Discussed role of nsaids in pain management and all relevant risks, if patient is continuing to require after 4 weeks the patient should f/u for alternative treatment. \par Instructed patient to maintain pain diary to monitor pain level, mobility, and function.\par \par \par

## 2022-04-20 ENCOUNTER — APPOINTMENT (OUTPATIENT)
Dept: PSYCHIATRY | Facility: CLINIC | Age: 87
End: 2022-04-20

## 2022-04-30 ENCOUNTER — TRANSCRIPTION ENCOUNTER (OUTPATIENT)
Age: 87
End: 2022-04-30

## 2022-05-25 ENCOUNTER — APPOINTMENT (OUTPATIENT)
Dept: CARDIOLOGY | Facility: CLINIC | Age: 87
End: 2022-05-25
